# Patient Record
Sex: MALE | Race: WHITE | NOT HISPANIC OR LATINO
[De-identification: names, ages, dates, MRNs, and addresses within clinical notes are randomized per-mention and may not be internally consistent; named-entity substitution may affect disease eponyms.]

---
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
## 2019-02-07 ENCOUNTER — HOSPITAL (OUTPATIENT)
Dept: OTHER | Age: 19
End: 2019-02-07
Attending: EMERGENCY MEDICINE

## 2019-02-13 PROBLEM — S52.572A OTHER CLOSED INTRA-ARTICULAR FRACTURE OF DISTAL END OF LEFT RADIUS, INITIAL ENCOUNTER: Status: ACTIVE | Noted: 2019-02-13

## 2024-11-09 ENCOUNTER — OFFICE VISIT (OUTPATIENT)
Dept: FAMILY MEDICINE CLINIC | Facility: CLINIC | Age: 24
End: 2024-11-09
Payer: COMMERCIAL

## 2024-11-09 VITALS
OXYGEN SATURATION: 98 % | WEIGHT: 173.81 LBS | RESPIRATION RATE: 16 BRPM | HEART RATE: 98 BPM | HEIGHT: 69 IN | DIASTOLIC BLOOD PRESSURE: 80 MMHG | SYSTOLIC BLOOD PRESSURE: 122 MMHG | BODY MASS INDEX: 25.74 KG/M2

## 2024-11-09 DIAGNOSIS — R53.83 OTHER FATIGUE: ICD-10-CM

## 2024-11-09 DIAGNOSIS — N62 GYNECOMASTIA, MALE: ICD-10-CM

## 2024-11-09 DIAGNOSIS — Z00.00 ROUTINE GENERAL MEDICAL EXAMINATION AT A HEALTH CARE FACILITY: ICD-10-CM

## 2024-11-09 DIAGNOSIS — A54.9 GONORRHEA: ICD-10-CM

## 2024-11-09 DIAGNOSIS — Z00.00 LABORATORY EXAMINATION ORDERED AS PART OF A ROUTINE GENERAL MEDICAL EXAMINATION: Primary | ICD-10-CM

## 2024-11-09 DIAGNOSIS — R21 RASH: ICD-10-CM

## 2024-11-09 LAB
ALBUMIN SERPL-MCNC: 4.6 G/DL (ref 3.2–4.8)
ALBUMIN/GLOB SERPL: 1.5 {RATIO} (ref 1–2)
ALP LIVER SERPL-CCNC: 100 U/L
ALT SERPL-CCNC: 23 U/L
ANION GAP SERPL CALC-SCNC: 7 MMOL/L (ref 0–18)
AST SERPL-CCNC: 23 U/L (ref ?–34)
BASOPHILS # BLD AUTO: 0.03 X10(3) UL (ref 0–0.2)
BASOPHILS NFR BLD AUTO: 0.7 %
BILIRUB SERPL-MCNC: 0.5 MG/DL (ref 0.3–1.2)
BILIRUB UR QL STRIP.AUTO: NEGATIVE
BUN BLD-MCNC: 14 MG/DL (ref 9–23)
CALCIUM BLD-MCNC: 10.3 MG/DL (ref 8.7–10.4)
CHLORIDE SERPL-SCNC: 105 MMOL/L (ref 98–112)
CHOLEST SERPL-MCNC: 190 MG/DL (ref ?–200)
CLARITY UR REFRACT.AUTO: CLEAR
CO2 SERPL-SCNC: 26 MMOL/L (ref 21–32)
CREAT BLD-MCNC: 1.08 MG/DL
EGFRCR SERPLBLD CKD-EPI 2021: 98 ML/MIN/1.73M2 (ref 60–?)
EOSINOPHIL # BLD AUTO: 0.21 X10(3) UL (ref 0–0.7)
EOSINOPHIL NFR BLD AUTO: 5 %
ERYTHROCYTE [DISTWIDTH] IN BLOOD BY AUTOMATED COUNT: 12.1 %
FASTING PATIENT LIPID ANSWER: YES
FASTING STATUS PATIENT QL REPORTED: YES
GLOBULIN PLAS-MCNC: 3 G/DL (ref 2–3.5)
GLUCOSE BLD-MCNC: 83 MG/DL (ref 70–99)
GLUCOSE UR STRIP.AUTO-MCNC: NORMAL MG/DL
HCT VFR BLD AUTO: 48.1 %
HDLC SERPL-MCNC: 64 MG/DL (ref 40–59)
HGB BLD-MCNC: 16 G/DL
IMM GRANULOCYTES # BLD AUTO: 0.01 X10(3) UL (ref 0–1)
IMM GRANULOCYTES NFR BLD: 0.2 %
KETONES UR STRIP.AUTO-MCNC: NEGATIVE MG/DL
LDLC SERPL CALC-MCNC: 118 MG/DL (ref ?–100)
LEUKOCYTE ESTERASE UR QL STRIP.AUTO: NEGATIVE
LYMPHOCYTES # BLD AUTO: 1.96 X10(3) UL (ref 1–4)
LYMPHOCYTES NFR BLD AUTO: 46.2 %
MCH RBC QN AUTO: 29.6 PG (ref 26–34)
MCHC RBC AUTO-ENTMCNC: 33.3 G/DL (ref 31–37)
MCV RBC AUTO: 88.9 FL
MONOCYTES # BLD AUTO: 0.45 X10(3) UL (ref 0.1–1)
MONOCYTES NFR BLD AUTO: 10.6 %
NEUTROPHILS # BLD AUTO: 1.58 X10 (3) UL (ref 1.5–7.7)
NEUTROPHILS # BLD AUTO: 1.58 X10(3) UL (ref 1.5–7.7)
NEUTROPHILS NFR BLD AUTO: 37.3 %
NITRITE UR QL STRIP.AUTO: NEGATIVE
NONHDLC SERPL-MCNC: 126 MG/DL (ref ?–130)
OSMOLALITY SERPL CALC.SUM OF ELEC: 286 MOSM/KG (ref 275–295)
PH UR STRIP.AUTO: 6.5 [PH] (ref 5–8)
PLATELET # BLD AUTO: 244 10(3)UL (ref 150–450)
POTASSIUM SERPL-SCNC: 4.1 MMOL/L (ref 3.5–5.1)
PROT SERPL-MCNC: 7.6 G/DL (ref 5.7–8.2)
PROT UR STRIP.AUTO-MCNC: NEGATIVE MG/DL
RBC # BLD AUTO: 5.41 X10(6)UL
RBC UR QL AUTO: NEGATIVE
SODIUM SERPL-SCNC: 138 MMOL/L (ref 136–145)
SP GR UR STRIP.AUTO: 1.01 (ref 1–1.03)
TRIGL SERPL-MCNC: 41 MG/DL (ref 30–149)
TSI SER-ACNC: 1.12 UIU/ML (ref 0.55–4.78)
UROBILINOGEN UR STRIP.AUTO-MCNC: NORMAL MG/DL
VLDLC SERPL CALC-MCNC: 7 MG/DL (ref 0–30)
WBC # BLD AUTO: 4.2 X10(3) UL (ref 4–11)

## 2024-11-09 PROCEDURE — 80053 COMPREHEN METABOLIC PANEL: CPT

## 2024-11-09 PROCEDURE — 81003 URINALYSIS AUTO W/O SCOPE: CPT

## 2024-11-09 PROCEDURE — 87491 CHLMYD TRACH DNA AMP PROBE: CPT

## 2024-11-09 PROCEDURE — 80061 LIPID PANEL: CPT

## 2024-11-09 PROCEDURE — 84443 ASSAY THYROID STIM HORMONE: CPT

## 2024-11-09 PROCEDURE — 87591 N.GONORRHOEAE DNA AMP PROB: CPT

## 2024-11-09 PROCEDURE — 99385 PREV VISIT NEW AGE 18-39: CPT

## 2024-11-09 PROCEDURE — 85025 COMPLETE CBC W/AUTO DIFF WBC: CPT

## 2024-11-09 NOTE — PATIENT INSTRUCTIONS
If stomach rash/discoloration gets bothersome, you can apply 1% hydrocortisone. This is over-the-counter.     Will do labs today and will reach out with results.     Follow up with endocrinology.     Let us know if you need anything!

## 2024-11-09 NOTE — PROGRESS NOTES
Subjective:   Derik Ivey is a 24 year old male who presents for Establish Care and Physical     Will establish today. I will order labs today and reach out to pt with results.      Radha wanted me to know that B12 and Vitamin D are not covered, so please do not order today.     Fatty pockets behind nipples. Is not happy with them. Feels these appeared when he was around 9 years old at the onset of puberty.     Does smoke cigarettes daily. Has noticed a film on back of his tongue.   Not much post-nasal drip noted.   About 1/2 pack daily. Is interested in quitting. Doesn't want medication. Is interested in being healthier.   Uses Nicoderm patches and chews gum.   Has about 1-2 cups coffee daily. Sometimes an energy drink.     Has a little rash on stomach near belt buckle, red and itchy at times and white sometimes.     Deferred vaccines    Exercise; weight training; no specific cardio  Diet: eats varied foods/ eats fruits and veggies  And fruit in protein shake every day    No significant family history known.     Pt and his girlfriend both recently were positive for gonorrhea. Both were treated. Pt would like test to confirm.     History/Other:    Chief Complaint Reviewed and Verified  No Further Nursing Notes to   Review  Allergies Reviewed  Medications Reviewed         Tobacco:  Social History     Tobacco Use   Smoking Status Every Day    Types: Cigarettes   Smokeless Tobacco Never     E-Cigarettes/Vaping       Questions Responses    E-Cigarette Use Never User           Tobacco cessation counseling for 3-10 minutes (add E/M code #49831).      Current Outpatient Medications   Medication Sig Dispense Refill    Multiple Vitamin (MULTI-VITAMIN DAILY) Oral Tab Take 1 tablet by mouth daily.      Saccharomyces boulardii (PROBIOTIC) 250 MG Oral Cap Take by mouth daily.           Review of Systems:  Review of Systems   Endocrine:        Small pouches behind bilat nipples; feels these have been present since about  age 9 or onset of puberty.    Skin:         Develops a rash/discoloration (white) of skin on stomach near where belt buckle touches. Notices sometimes when he gets out of the shower. Clears up on its own. Sometimes itchy.    All other systems reviewed and are negative.        Objective:   /80 (BP Location: Left arm, Patient Position: Sitting, Cuff Size: adult)   Pulse 98   Resp 16   Ht 5' 9\" (1.753 m)   Wt 173 lb 12.8 oz (78.8 kg)   SpO2 98%   BMI 25.67 kg/m²  Estimated body mass index is 25.67 kg/m² as calculated from the following:    Height as of this encounter: 5' 9\" (1.753 m).    Weight as of this encounter: 173 lb 12.8 oz (78.8 kg).  Physical Exam  Vitals reviewed.   Constitutional:       General: He is not in acute distress.     Appearance: Normal appearance. He is normal weight. He is not ill-appearing, toxic-appearing or diaphoretic.   HENT:      Head: Normocephalic and atraumatic.      Right Ear: Tympanic membrane, ear canal and external ear normal. There is no impacted cerumen.      Left Ear: Tympanic membrane, ear canal and external ear normal. There is no impacted cerumen.      Nose: Nose normal.      Mouth/Throat:      Mouth: Mucous membranes are moist.      Pharynx: No oropharyngeal exudate or posterior oropharyngeal erythema.   Eyes:      General: No scleral icterus.        Right eye: No discharge.         Left eye: No discharge.      Conjunctiva/sclera: Conjunctivae normal.   Cardiovascular:      Rate and Rhythm: Normal rate and regular rhythm.      Pulses: Normal pulses.      Heart sounds: Normal heart sounds. No murmur heard.     No friction rub. No gallop.   Pulmonary:      Effort: Pulmonary effort is normal. No respiratory distress.      Breath sounds: Normal breath sounds. No stridor. No wheezing, rhonchi or rales.   Chest:      Chest wall: No tenderness.   Abdominal:      General: Abdomen is flat. Bowel sounds are normal.      Palpations: Abdomen is soft.   Musculoskeletal:          General: Normal range of motion.      Cervical back: Normal range of motion. No rigidity.   Lymphadenopathy:      Cervical: No cervical adenopathy.   Skin:     General: Skin is warm and dry.   Neurological:      General: No focal deficit present.      Mental Status: He is alert and oriented to person, place, and time.   Psychiatric:         Mood and Affect: Mood normal.         Behavior: Behavior normal.         Thought Content: Thought content normal.         Judgment: Judgment normal.         Assessment & Plan:   1. Laboratory examination ordered as part of a routine general medical examination (Primary)  -     Urinalysis with Culture Reflex; Future; Expected date: 11/09/2024  -     TSH W Reflex To Free T4; Future; Expected date: 11/09/2024  -     Lipid Panel; Future; Expected date: 11/09/2024  -     Comp Metabolic Panel (14); Future; Expected date: 11/09/2024  -     CBC With Differential With Platelet; Future; Expected date: 11/09/2024  -     Urinalysis with Culture Reflex  -     TSH W Reflex To Free T4  -     Lipid Panel  -     Comp Metabolic Panel (14)  -     CBC With Differential With Platelet  2. Routine general medical examination at a health care facility  -     Urinalysis with Culture Reflex; Future; Expected date: 11/09/2024  -     TSH W Reflex To Free T4; Future; Expected date: 11/09/2024  -     Lipid Panel; Future; Expected date: 11/09/2024  -     Comp Metabolic Panel (14); Future; Expected date: 11/09/2024  -     CBC With Differential With Platelet; Future; Expected date: 11/09/2024  -     Urinalysis with Culture Reflex  -     TSH W Reflex To Free T4  -     Lipid Panel  -     Comp Metabolic Panel (14)  -     CBC With Differential With Platelet  3. Other fatigue  -     Chlamydia/Gc Amplification; Future; Expected date: 11/09/2024  -     Urinalysis with Culture Reflex; Future; Expected date: 11/09/2024  -     TSH W Reflex To Free T4; Future; Expected date: 11/09/2024  -     Lipid Panel; Future; Expected  date: 11/09/2024  -     Comp Metabolic Panel (14); Future; Expected date: 11/09/2024  -     CBC With Differential With Platelet; Future; Expected date: 11/09/2024  -     Chlamydia/Gc Amplification  -     Urinalysis with Culture Reflex  -     TSH W Reflex To Free T4  -     Lipid Panel  -     Comp Metabolic Panel (14)  -     CBC With Differential With Platelet  4. Gynecomastia, male  -     Endocrine Referral - In Network  5. Gonorrhea  -     Chlamydia/Gc Amplification; Future; Expected date: 11/09/2024  -     Urinalysis with Culture Reflex; Future; Expected date: 11/09/2024  -     TSH W Reflex To Free T4; Future; Expected date: 11/09/2024  -     Lipid Panel; Future; Expected date: 11/09/2024  -     Comp Metabolic Panel (14); Future; Expected date: 11/09/2024  -     CBC With Differential With Platelet; Future; Expected date: 11/09/2024  -     Chlamydia/Gc Amplification  -     Urinalysis with Culture Reflex  -     TSH W Reflex To Free T4  -     Lipid Panel  -     Comp Metabolic Panel (14)  -     CBC With Differential With Platelet  6. Rash  If stomach rash/discoloration gets bothersome, you can apply 1% hydrocortisone. This is over-the-counter.     Will do labs today and will reach out with results.     Follow up with endocrinology.     Make sure you're staying well hydrated       Return in about 1 year (around 11/9/2025) for Physical.    Nimisha Miranda, KODY, 11/9/2024, 10:07 AM

## 2024-11-11 LAB
C TRACH DNA SPEC QL NAA+PROBE: NEGATIVE
N GONORRHOEA DNA SPEC QL NAA+PROBE: NEGATIVE